# Patient Record
Sex: MALE | Race: WHITE | URBAN - METROPOLITAN AREA
[De-identification: names, ages, dates, MRNs, and addresses within clinical notes are randomized per-mention and may not be internally consistent; named-entity substitution may affect disease eponyms.]

---

## 2018-03-22 ENCOUNTER — HOSPITAL ENCOUNTER (OUTPATIENT)
Facility: CLINIC | Age: 38
Setting detail: OBSERVATION
Discharge: HOME | End: 2018-03-22
Attending: EMERGENCY MEDICINE | Admitting: HOSPITALIST
Payer: COMMERCIAL

## 2018-03-22 ENCOUNTER — APPOINTMENT (OUTPATIENT)
Dept: GENERAL RADIOLOGY | Facility: CLINIC | Age: 38
End: 2018-03-22
Attending: EMERGENCY MEDICINE
Payer: COMMERCIAL

## 2018-03-22 ENCOUNTER — APPOINTMENT (OUTPATIENT)
Dept: CT IMAGING | Facility: CLINIC | Age: 38
End: 2018-03-22
Attending: EMERGENCY MEDICINE
Payer: COMMERCIAL

## 2018-03-22 VITALS
RESPIRATION RATE: 12 BRPM | HEIGHT: 71 IN | WEIGHT: 177.91 LBS | TEMPERATURE: 96.9 F | BODY MASS INDEX: 24.91 KG/M2 | OXYGEN SATURATION: 96 % | SYSTOLIC BLOOD PRESSURE: 109 MMHG | DIASTOLIC BLOOD PRESSURE: 61 MMHG | HEART RATE: 90 BPM

## 2018-03-22 DIAGNOSIS — R09.02 HYPOXIA: ICD-10-CM

## 2018-03-22 DIAGNOSIS — T50.905A MEDICATION REACTION, INITIAL ENCOUNTER: ICD-10-CM

## 2018-03-22 PROBLEM — R42 DIZZINESS: Status: ACTIVE | Noted: 2018-03-22

## 2018-03-22 LAB
AMPHETAMINES UR QL SCN: NEGATIVE
ANION GAP SERPL CALCULATED.3IONS-SCNC: 12 MMOL/L (ref 3–14)
ANION GAP SERPL CALCULATED.3IONS-SCNC: 9 MMOL/L (ref 3–14)
BARBITURATES UR QL: NEGATIVE
BASOPHILS # BLD AUTO: 0 10E9/L (ref 0–0.2)
BASOPHILS NFR BLD AUTO: 0.2 %
BENZODIAZ UR QL: NEGATIVE
BUN SERPL-MCNC: 20 MG/DL (ref 7–30)
BUN SERPL-MCNC: 24 MG/DL (ref 7–30)
CALCIUM SERPL-MCNC: 7.8 MG/DL (ref 8.5–10.1)
CALCIUM SERPL-MCNC: 8.5 MG/DL (ref 8.5–10.1)
CANNABINOIDS UR QL SCN: NEGATIVE
CHLORIDE SERPL-SCNC: 101 MMOL/L (ref 94–109)
CHLORIDE SERPL-SCNC: 104 MMOL/L (ref 94–109)
CO2 SERPL-SCNC: 24 MMOL/L (ref 20–32)
CO2 SERPL-SCNC: 25 MMOL/L (ref 20–32)
COCAINE UR QL: NEGATIVE
CREAT SERPL-MCNC: 1.18 MG/DL (ref 0.66–1.25)
CREAT SERPL-MCNC: 1.5 MG/DL (ref 0.66–1.25)
D DIMER PPP FEU-MCNC: 0.4 UG/ML FEU (ref 0–0.5)
DIFFERENTIAL METHOD BLD: ABNORMAL
EOSINOPHIL # BLD AUTO: 0 10E9/L (ref 0–0.7)
EOSINOPHIL NFR BLD AUTO: 0.1 %
ERYTHROCYTE [DISTWIDTH] IN BLOOD BY AUTOMATED COUNT: 13.1 % (ref 10–15)
ETHANOL SERPL-MCNC: <0.01 G/DL
GFR SERPL CREATININE-BSD FRML MDRD: 53 ML/MIN/1.7M2
GFR SERPL CREATININE-BSD FRML MDRD: 70 ML/MIN/1.7M2
GLUCOSE SERPL-MCNC: 81 MG/DL (ref 70–99)
GLUCOSE SERPL-MCNC: 95 MG/DL (ref 70–99)
HCT VFR BLD AUTO: 45.7 % (ref 40–53)
HGB BLD-MCNC: 15.5 G/DL (ref 13.3–17.7)
IMM GRANULOCYTES # BLD: 0.2 10E9/L (ref 0–0.4)
IMM GRANULOCYTES NFR BLD: 1.1 %
INTERPRETATION ECG - MUSE: NORMAL
LACTATE BLD-SCNC: 1.9 MMOL/L (ref 0.7–2)
LYMPHOCYTES # BLD AUTO: 0.9 10E9/L (ref 0.8–5.3)
LYMPHOCYTES NFR BLD AUTO: 5.5 %
MCH RBC QN AUTO: 32 PG (ref 26.5–33)
MCHC RBC AUTO-ENTMCNC: 33.9 G/DL (ref 31.5–36.5)
MCV RBC AUTO: 94 FL (ref 78–100)
MONOCYTES # BLD AUTO: 1.8 10E9/L (ref 0–1.3)
MONOCYTES NFR BLD AUTO: 11.2 %
NEUTROPHILS # BLD AUTO: 13.5 10E9/L (ref 1.6–8.3)
NEUTROPHILS NFR BLD AUTO: 81.9 %
OPIATES UR QL SCN: NEGATIVE
PCP UR QL SCN: NEGATIVE
PLATELET # BLD AUTO: 208 10E9/L (ref 150–450)
POTASSIUM SERPL-SCNC: 4 MMOL/L (ref 3.4–5.3)
POTASSIUM SERPL-SCNC: 5.3 MMOL/L (ref 3.4–5.3)
RBC # BLD AUTO: 4.85 10E12/L (ref 4.4–5.9)
SODIUM SERPL-SCNC: 134 MMOL/L (ref 133–144)
SODIUM SERPL-SCNC: 141 MMOL/L (ref 133–144)
WBC # BLD AUTO: 14.2 10E9/L (ref 4–11)
WBC # BLD AUTO: 16.5 10E9/L (ref 4–11)

## 2018-03-22 PROCEDURE — 25000132 ZZH RX MED GY IP 250 OP 250 PS 637: Performed by: HOSPITALIST

## 2018-03-22 PROCEDURE — 85025 COMPLETE CBC W/AUTO DIFF WBC: CPT | Performed by: EMERGENCY MEDICINE

## 2018-03-22 PROCEDURE — 80320 DRUG SCREEN QUANTALCOHOLS: CPT | Performed by: EMERGENCY MEDICINE

## 2018-03-22 PROCEDURE — 85379 FIBRIN DEGRADATION QUANT: CPT | Performed by: EMERGENCY MEDICINE

## 2018-03-22 PROCEDURE — G0378 HOSPITAL OBSERVATION PER HR: HCPCS

## 2018-03-22 PROCEDURE — 96374 THER/PROPH/DIAG INJ IV PUSH: CPT

## 2018-03-22 PROCEDURE — 80307 DRUG TEST PRSMV CHEM ANLYZR: CPT | Performed by: HOSPITALIST

## 2018-03-22 PROCEDURE — 99285 EMERGENCY DEPT VISIT HI MDM: CPT | Mod: 25

## 2018-03-22 PROCEDURE — 80048 BASIC METABOLIC PNL TOTAL CA: CPT | Performed by: EMERGENCY MEDICINE

## 2018-03-22 PROCEDURE — 96361 HYDRATE IV INFUSION ADD-ON: CPT

## 2018-03-22 PROCEDURE — 93005 ELECTROCARDIOGRAM TRACING: CPT

## 2018-03-22 PROCEDURE — 25000131 ZZH RX MED GY IP 250 OP 636 PS 637: Performed by: EMERGENCY MEDICINE

## 2018-03-22 PROCEDURE — 71046 X-RAY EXAM CHEST 2 VIEWS: CPT

## 2018-03-22 PROCEDURE — 80048 BASIC METABOLIC PNL TOTAL CA: CPT | Performed by: PHYSICIAN ASSISTANT

## 2018-03-22 PROCEDURE — 85048 AUTOMATED LEUKOCYTE COUNT: CPT | Mod: 91 | Performed by: PHYSICIAN ASSISTANT

## 2018-03-22 PROCEDURE — 25000128 H RX IP 250 OP 636: Performed by: EMERGENCY MEDICINE

## 2018-03-22 PROCEDURE — 70450 CT HEAD/BRAIN W/O DYE: CPT

## 2018-03-22 PROCEDURE — 36415 COLL VENOUS BLD VENIPUNCTURE: CPT | Performed by: PHYSICIAN ASSISTANT

## 2018-03-22 PROCEDURE — 25000128 H RX IP 250 OP 636: Performed by: HOSPITALIST

## 2018-03-22 PROCEDURE — 83605 ASSAY OF LACTIC ACID: CPT | Performed by: EMERGENCY MEDICINE

## 2018-03-22 PROCEDURE — 99220 ZZC INITIAL OBSERVATION CARE,LEVL III: CPT | Performed by: HOSPITALIST

## 2018-03-22 RX ORDER — ONDANSETRON 2 MG/ML
4 INJECTION INTRAMUSCULAR; INTRAVENOUS EVERY 6 HOURS PRN
Status: DISCONTINUED | OUTPATIENT
Start: 2018-03-22 | End: 2018-03-22 | Stop reason: HOSPADM

## 2018-03-22 RX ORDER — GABAPENTIN 100 MG/1
300 CAPSULE ORAL 3 TIMES DAILY
COMMUNITY

## 2018-03-22 RX ORDER — NALOXONE HYDROCHLORIDE 0.4 MG/ML
.1-.4 INJECTION, SOLUTION INTRAMUSCULAR; INTRAVENOUS; SUBCUTANEOUS
Status: DISCONTINUED | OUTPATIENT
Start: 2018-03-22 | End: 2018-03-22 | Stop reason: HOSPADM

## 2018-03-22 RX ORDER — ACETAMINOPHEN 325 MG/1
650 TABLET ORAL EVERY 4 HOURS PRN
Status: DISCONTINUED | OUTPATIENT
Start: 2018-03-22 | End: 2018-03-22 | Stop reason: HOSPADM

## 2018-03-22 RX ORDER — ACETAMINOPHEN 650 MG/1
650 SUPPOSITORY RECTAL EVERY 4 HOURS PRN
Status: DISCONTINUED | OUTPATIENT
Start: 2018-03-22 | End: 2018-03-22 | Stop reason: HOSPADM

## 2018-03-22 RX ORDER — SODIUM CHLORIDE 9 MG/ML
INJECTION, SOLUTION INTRAVENOUS CONTINUOUS
Status: DISCONTINUED | OUTPATIENT
Start: 2018-03-22 | End: 2018-03-22 | Stop reason: HOSPADM

## 2018-03-22 RX ORDER — MECLIZINE HYDROCHLORIDE 25 MG/1
25 TABLET ORAL ONCE
Status: COMPLETED | OUTPATIENT
Start: 2018-03-22 | End: 2018-03-22

## 2018-03-22 RX ORDER — ONDANSETRON 4 MG/1
4 TABLET, ORALLY DISINTEGRATING ORAL EVERY 6 HOURS PRN
Status: DISCONTINUED | OUTPATIENT
Start: 2018-03-22 | End: 2018-03-22 | Stop reason: HOSPADM

## 2018-03-22 RX ORDER — ONDANSETRON 2 MG/ML
4 INJECTION INTRAMUSCULAR; INTRAVENOUS ONCE
Status: COMPLETED | OUTPATIENT
Start: 2018-03-22 | End: 2018-03-22

## 2018-03-22 RX ORDER — MECLIZINE HCL 12.5 MG 12.5 MG/1
12.5 TABLET ORAL 3 TIMES DAILY PRN
Status: DISCONTINUED | OUTPATIENT
Start: 2018-03-22 | End: 2018-03-22 | Stop reason: HOSPADM

## 2018-03-22 RX ORDER — POLYETHYLENE GLYCOL 3350 17 G/17G
17 POWDER, FOR SOLUTION ORAL DAILY PRN
Status: DISCONTINUED | OUTPATIENT
Start: 2018-03-22 | End: 2018-03-22 | Stop reason: HOSPADM

## 2018-03-22 RX ORDER — DOCUSATE SODIUM 100 MG/1
100 CAPSULE, LIQUID FILLED ORAL 2 TIMES DAILY
Status: DISCONTINUED | OUTPATIENT
Start: 2018-03-22 | End: 2018-03-22 | Stop reason: HOSPADM

## 2018-03-22 RX ADMIN — MECLIZINE HYDROCHLORIDE 25 MG: 25 TABLET ORAL at 02:39

## 2018-03-22 RX ADMIN — ACETAMINOPHEN 650 MG: 325 TABLET, FILM COATED ORAL at 06:04

## 2018-03-22 RX ADMIN — ONDANSETRON 4 MG: 2 INJECTION INTRAMUSCULAR; INTRAVENOUS at 02:39

## 2018-03-22 RX ADMIN — SODIUM CHLORIDE 1000 ML: 9 INJECTION, SOLUTION INTRAVENOUS at 03:11

## 2018-03-22 RX ADMIN — SODIUM CHLORIDE, PRESERVATIVE FREE: 5 INJECTION INTRAVENOUS at 05:54

## 2018-03-22 ASSESSMENT — ENCOUNTER SYMPTOMS
NECK PAIN: 0
VOMITING: 1
DIZZINESS: 1
TREMORS: 1
HEADACHES: 0

## 2018-03-22 NOTE — DISCHARGE SUMMARY
Patient discharged to home on 3/22/2018 at 11:08 AM ad lorri. All belongings with patient. Patient education given and all questions answered. Medication regimen discussed with patient. IV removed. Upcoming appointments also discussed. Patient verbalized understanding.

## 2018-03-22 NOTE — ED PROVIDER NOTES
"  History     Chief Complaint:  Dizziness    The history is provided by the patient and the EMS personnel.      Mateo Spencer is a 38 year old male who presents via EMS with dizziness. The patient woke up about 45 minutes ago with ringing in his ears bilaterally. When he got up to walk around he immediately felt dizziness and \"his gait was off.\" He called EMS after he was unable to walk upright and was crawling on the floor on all fours to steady himself. When EMS arrived the patient had 1 episode of emesis. Here in the ED the patient states that the ringing in his ears has resolved but he still feels slightly dizzy and shaky. Of note, the patient started Gabapentin about 5 weeks ago and his dosing has not changed. He also took a leftover pain medication for his chronic back pain earlier this evening. He denies any illicit drug use, headache, neck pain, recent head injury, or other medical concerns.     Allergies:  No known drug allergies      Medications:    Gabapentin    Past Medical History:    The patient does not have any past pertinent medical history.     Past Surgical History:    History reviewed. No pertinent surgical history.     Family History:    History reviewed. No pertinent family history.      Social History:  Presents via EMS   Tobacco use: Current Every Day Smoker  Alcohol use: Yes  PCP: Physician No Ref-Primary    Marital Status:  Single    Review of Systems   Gastrointestinal: Positive for vomiting.   Musculoskeletal: Positive for gait problem. Negative for neck pain.   Neurological: Positive for dizziness and tremors. Negative for headaches.   All other systems reviewed and are negative.    Physical Exam     Patient Vitals for the past 24 hrs:   BP Temp Temp src Pulse Resp SpO2 Height Weight   03/22/18 0400 97/66 - - - - 98 % - -   03/22/18 0330 99/64 - - - - 98 % - -   03/22/18 0317 99/68 - - - - 98 % - -   03/22/18 0300 - - - - - (!) 89 % - -   03/22/18 0237 - - - - - 99 % - - " "  03/22/18 0236 - - - - - (!) 81 % - -   03/22/18 0227 - 97.1  F (36.2  C) Temporal - - - - -   03/22/18 0223 122/72 - - 96 20 100 % 1.803 m (5' 11\") 78 kg (172 lb)      Physical Exam  Constitutional: The patient is oriented to person, place, and time.   HENT:   Head: Atraumatic  Right Ear: Normal  Left Ear: Normal  Nose: Nose normal.   Mouth/Throat: Oropharynx is clear and moist. No erythema or exudate.   Eyes: Conjunctivae and EOM are normal. Pupils are equal, round, and reactive to light. No discharge. Pinpoint pupils, no nystagmus.  Neck: Normal range of motion. Neck supple.   Cardiovascular: Tachycardic rate, regular rhythm, no murmur gallops or rubs. Intact distal pulses.    Pulmonary/Chest: CTA bilaterally. No wheezes rale or rhonchi.  Abdominal: Soft. Non tender.  No masses   Musculoskeletal: No edema. No bony deformity. Normal range of motion  Lymphadenopathy:     The patient has no cervical adenopathy.   Neurological: The patient is alert and oriented to person, place, and time. The patient has normal strength and normal reflexes. No cranial nerve deficit. Coordination normal. Able to perform finger-nose and heel-shin without difficulty.  Skin: Skin is warm and dry. No rash noted. The patient is not diaphoretic.   Psychiatric: The patient has a normal mood and affect.     Emergency Department Course   ECG (2:34:21):  Rate 102 bpm. FL interval 154. QRS duration 100. QT/QTc 392/510. P-R-T axes 67 72 41. Sinus tachycardia. Otherwise normal ECG. Agree with computer interpretation. Interpreted at 0236 by Edwin Gonzalez MD.     Imaging:  Radiographic findings were communicated with the patient who voiced understanding of the findings.  Head CT w/o Contrast  IMPRESSION: No acute abnormality.      LATRELL RUIZ MD    Chest XR, PA & LAT  IMPRESSION: No acute abnormality.  Preliminary reading per radiology.     Imaging independently reviewed and agree with radiologist interpretation.     Laboratory:  CBC: WBC " 16.5 (H), o/w WNL (HGB 15.5, )    BMP: Creatinine 1.50 (H), GFR Estimate 53 (L), o/w WNL   D-dimer: 0.4  Lactic Acid: 1.9      Alcohol ethyl: <0.01    Interventions:  0239: Zofran 4mg IV injection    0239: Antivert 25 mg PO  0311: NS 1L IV Bolus     Emergency Department Course:  Past medical records, nursing notes, and vitals reviewed.  0223: I performed an exam of the patient and obtained history, as documented above.    0340: I rechecked the patient. Explained findings to patient.     Findings and plan explained to the Patient who consents to admission.     0440: Discussed the patient with Dr. Leung, who will admit the patient to an obs bed for further monitoring, evaluation, and treatment.      I personally reviewed the laboratory results with the Patient and answered all related questions prior to admit.     Impression & Plan    Medical Decision Making:  Mateo Spencer is a 38 year old male who presents with a chief complaint of dizziness which came on suddenly at around 0400 this morning. His physical exam is unremarkable other than a mild tachycardia with a rate of around 105. I did note that he had some pinpoint pupils as well. He did admit to taking a single dose of oral pain medication earlier in the evening. Patient negative head CT and normal neurologic exam with able to perform normal finger-nose and heel-shin without difficulty, making intercranial process unlikely. Patient was somewhat sleepy and noted to become somewhat hypoxic when falling asleep which I suspect is related to narcotic pain medication. Did consider possibility of infection as he does note having a cough recently and elevated WBC; however, his chest XR is negative and he is afebrile with lactate within the normal range at 1.9. Patient continued to have persistent hypoxia when off oxygen, especially when asleep and thus I felt he was not safe for discharge home. Upon further discussion he actually took a friend's  methadone. He is unsure of the dose.  This  may be causing all of his symptoms at this time. In light of this I do feel admission and observation is warranted until this clears.   as the half life is quite long I do not feel  he can be simply observed here in the ED. I spoke with Dr. Leung of hospitalist service and he accepts the patient for further observation.  Diagnosis:    ICD-10-CM    1. Medication reaction, initial encounter T88.7XXA    2. Hypoxia R09.02        Disposition:  Admitted to Dr. Leung for further evaluation and treatment.      Palomo Esposito  3/22/2018    EMERGENCY DEPARTMENT  I, Palomo Esposito, am serving as a scribe at 2:23 AM on 3/22/2018 to document services personally performed by Edwin Gonzalez MD based on my observations and the provider's statements to me.       Edwin Gonzalez MD  03/22/18 0531

## 2018-03-22 NOTE — DISCHARGE SUMMARY
Admit Date:     03/22/2018   Discharge Date:     03/22/2018      PRIMARY CARE PROVIDER:  None.        DATE OF ADMISSION:  03/22/2018.      DATE OF DISCHARGE:  03/22/2018.      DISCHARGE DIAGNOSES:   1.  Dizziness.   2.  Hypoxia.   3.  Elevated creatinine, thought to be secondary to dehydration.   4.  Tobacco abuse.      DISCHARGE MEDICATIONS:      Review of your medicines      CONTINUE these medicines which have NOT CHANGED       Dose / Directions    gabapentin 100 MG capsule   Commonly known as:  NEURONTIN        Dose:  300 mg   Take 300 mg by mouth 3 times daily   Refills:  0       IBUPROFEN PO        Dose:  400 mg   Take 400 mg by mouth daily as needed for moderate pain   Refills:  0         STOP taking          METHADONE HCL PO                  ALLERGIES:  No known drug allergies.      DISPOSITION:  Home.      FOLLOWUP WITH RECOMMENDATIONS:  The patient should follow up with primary care provider within 1 week for hospital followup.  Recommend checking a basic metabolic panel.      ACTIVITY:  As tolerated.      DIET:  Regular diet.      CONSULTS:  None.      LABORATORY, IMAGING AND PROCEDURES:   1.  Routine laboratory studies that included basic metabolic panel x 2.  CBC with platelets, differential, D-dimer, ethyl alcohol level, lactic acid level, urine drug screen and white blood cell count.   2.  Head CT without contrast.   3.  Two-view chest x-ray.   4.  EKG.      PENDING RESULTS:  None.      PHYSICAL EXAMINATION ON DAY OF DISCHARGE:   VITAL SIGNS:  Temperature is 96.9 degrees Fahrenheit with a blood pressure 109/61, heart rate of 90 beats per minute, respiratory rate of 12, O2 saturation 96% on room air.  The patient is denying any pain.   GENERAL:  The patient is awake, alert and cooperative, in no apparent distress, alert and oriented x 3.   HEENT:  Head is normocephalic, atraumatic.  Moist mucous membranes present.  No exudates noted in the posterior pharynx.  Uvula is midline.   NECK:  Supple, normal  range of motion, no tracheal deviation, no cervical lymphadenopathy present.   CARDIOVASCULAR:  Regular rate and rhythm, no rubs, murmurs or gallops appreciated.   PULMONARY:  Lungs are clear to auscultation bilaterally, no wheezes, rhonchi or rales appreciated.   GASTROINTESTINAL:  Bowel sounds are present in all 4 quadrants, soft, nontender, nondistended.   NEUROLOGIC:  Cranial nerves II-XII are grossly intact.  The patient is seen moving all 4 extremities without any difficulty.   EXTREMITIES:  No lower extremity edema noted bilaterally.  Calves are nontender to palpation.      BRIEF HISTORY OF PRESENT ILLNESS:  Mateo Spencer is a 38-year-old male with a past medical history significant for low back pain with noted bulging disk and radiculopathy who was registered to observation due to dizziness, hypoxia, and suspected acute kidney injury.      HOSPITAL COURSE:   1.  Dizziness:  Initially unclear etiology, though I believe that this is likely secondary to dehydration.  The patient also taken at friend's 10 mg dose of methadone, which could have also contributed to dizziness once this began wearing off.  U-Tox came back negative.  The patient was started on IV fluids and received 1 liter bolus in the Emergency Department and continued IV fluid resuscitation at 150 mL an hour with normal saline.  At time of discharge, dizziness has resolved.   2.  Hypoxia:  This was thought to be due to lack of deep breaths.  The patient had noted O2 saturations dropping into the mid-80s but with appropriate response when advised to take deep breaths.  He denied dyspnea.  Chest x-ray was negative for any acute findings.  This has since resolved and patient is currently on room air without any complaints of shortness of breath.   3.  Acute kidney injury:  The patient had noted a creatinine level of 1.5 with a GFR of 53 without known baseline.  The patient has received IV fluid resuscitation and upon repeat basic metabolic  panel, creatinine has normalized at 1.18 with a GFR of 70.  Upon discussion with the patient, it appears he has had decreased p.o. intake, especially with water and has been advised to make a better effort with oral intake.   4.  Tobacco abuse:  Nicotine gum was ordered during this stay but I do not believe it was utilized.  The patient will follow up with primary care provider regarding cessation counseling.   5.  Discharge Pain Plan: Patient has ongoing back pain due to bulging disc being worked up as an outpatient.  No changes were made to ongoing regimen including gabapentin 300 mg TID and PRN IBU.  Patient advised not to take friend's methadone.       CODE STATUS:  Full code.      The patient was discussed with Dr. Jimmy Maher who agrees with discharge at this time.  Dr. Maher will evaluate the patient independently.      Total discharge time less than 30 minutes.         JIMMY MAHER MD       As dictated by REMINGTON GODOY PA-C            D: 2018   T: 2018   MT: SHERRY      Name:     LIZET SOLITARIO   MRN:      [id number]        Account:        [id number]   :      1980           Admit Date:     2018                                  Discharge Date: 2018      Document: [id number]

## 2018-03-22 NOTE — PLAN OF CARE
Problem: Patient Care Overview  Goal: Plan of Care/Patient Progress Review  Outcome: Improving  PRIOR TO DISCHARGE     Comments: -diagnostic tests and consults completed and resulted: met  -vital signs normal or at patient baseline: met on RA   -dizziness improves, able to get up and ambulate safely: met, able to ambulate without dizziness     A&Ox4, VSS on RA. C/o sweats, no further c/o dizziness. Tele NSR. Denies pain. Up ad lorri, has slight limp due to sciatica. Reg diet, voiding adequately. Urine sample sent - negative. Blood drawn for kidney function.  IV NS at 150/hour for shift.

## 2018-03-22 NOTE — H&P
"Admitted:     03/22/2018      PRIMARY CARE PHYSICIAN:  None.      CHIEF COMPLAINT:  Dizziness.      HISTORY OF PRESENT ILLNESS:  Mateo Spencer is a 38-year-old male with a medical history significant for lumber disk herniation and sciatica, was brought to the ER by EMS for evaluation of dizziness.  I discussed with the patient and Dr. Gonzalez who evaluated the patient in the ER for further information.  I reviewed record in Care Everywhere as well.      According to the patient, he was not able to sleep for almost 2 months because of his back pain and so talked to his friend who gave him a \"pain pill\", which he said was methadone.  He took it at around 10 in the morning yesterday and wanted to take a nap so went to bed around 5:00 p.m.  He says he took a long nap.  He says something woke him up around 4:00 in the morning but on getting up patient felt extremely dizzy and had to crawl on the floor.  He felt a ringing sensation in the ear and also could not hear.  He was very shaky and did not have any strength and he kept falling to the floor.      Patient denies any chest pain, palpitations, blurry vision or diplopia.  He felt feverish and hot and chills, had nausea and vomited about 3-4 times.  Denies abdominal pain or diarrhea.  He then called 911 for help.  In ER, the patient was evaluated by Dr. Gonzalez.  His vitals other than pulse ox were stable.  The patient frequently was dozing off and his oxygen SAT was dropping to 80s.  Lab data showed a creatinine of 1.50 with no known baseline.  Lactic acid was negative.  WBC was 16.5, D-dimer 0.4.  Alcohol less than 0.01.  Blood sugar was 81.  A 12-lead EKG showed sinus tachycardia.  Chest x-ray was done which did not show any abnormality.  CT head without contrast was again negative for any acute process.  U-tox was requested and is pending.  The patient will be placed in observation for close monitoring.      The patient denies any recent ear infection, cough " and cold, sore throat.  He denies dizziness with any specific position.  Denies headache, earache.      REVIEW OF SYSTEMS:  All 10-point systems were reviewed and is negative other than mentioned in the HPI.      PAST MEDICAL HISTORY:  L5-S4 disk herniation and sciatica on right, patient is on gabapentin 300 t.i.d., no recent change in medication dose.      PAST SURGICAL HISTORY:  Epidural injections about 2 weeks ago.      ALLERGIES:  NO KNOWN DRUG ALLERGIES.      PTA MEDICATIONS:  Gabapentin 300 mg p.o. t.i.d.      SOCIAL HISTORY:  Smokes 1 pack per day.  Drinks socially.  Denies any recreational drug use.      FAMILY HISTORY:  Father with colon cancer.      PHYSICAL EXAMINATION:   GENERAL:  The patient is alert, awake, oriented, appears comfortable.   VITAL SIGNS:  Blood pressure 110/81, heart rate 96, temperature 97.1, respirations 20.  Pulse ox 95, which is dropping to mid 80s frequently but promptly comes up to 90s.   HEENT:  Pupils are bilaterally very constricted to about 1.5-2 mm in size.  Oral mucosa moist, mild pharyngeal congestion.   NECK:  Supple.   LUNGS:  Bilateral equal air entry, clear to auscultation.  Normal work of breathing, placed on 2 liters nasal cannula oxygen.   CARDIOVASCULAR:  S1, S2, regular, no murmur, rub, gallop.   ABDOMEN:  Soft, nontender, bowel sounds active.   MUSCULOSKELETAL:  No edema.   NEUROLOGIC:  Alert and oriented.  Cranial nerves II-XII intact.   SKIN:  Has minor abrasion over the right lip.      LABORATORY DATA:  White cell count 16.5, hemoglobin 15.5, hematocrit 45.7, platelet 208,000.  Sodium 141, potassium 4, chloride 104, bicarbonate 25, BUN 20, creatinine 1.5, calcium 8.5, anion gap 12.  Lactic acid 1.9.  Blood sugar 81.  D-dimer 0.4.  Alcohol level less than 0.01      Chest x-ray was reviewed by me, which does show somewhat globular heart, but is an A/P view, no pneumothorax, effusion or infiltrate noted.  No mediastinal widening noted.      IMAGING:  CT head  without contrast report was reviewed by me.  It is negative for acute abnormality.  A 12-lead EKG shows sinus tachycardia.      ASSESSMENT AND PLAN:  Mateo Spencer is a 38-year-old man with back pain/sciatica who was brought to the ER for evaluation of dizziness.      1.  Dizziness.  Unclear etiology, but I do suspect this could be related to medication or unknown substance.  The patient denies any drug abuse.  He stated he took a pill of methadone around 10 in the morning yesterday which was given by his friend.  His pupils are pinpoint.  He has some conjunctival congestion.  There is no chest pain, tachycardia, neuro deficits on exam.  The D-dimer was negative.  CT head also negative for acute process.     -The patient will be placed in observation.   -Monitor with telemetry and continuous pulse ox.     -I have requested U-tox, will follow.     -I will hydrate him with normal saline at 150 mL an hour.   -Check orthostatic blood pressure.     -Further workup based on urine toxicity.      2.  Hypoxia, likely due to lack of deep breaths.  His oxygen saturations are dropping to mid 80s but comes up to normal appropriately.  No dyspnea.  Chest x-ray normal.  He had a few episodes of vomiting but no sign of aspiration.  He has mild leukocytosis but is afebrile otherwise.  Continue to monitor.      4.  Elevated creatinine, unknown baseline/other prior creatinine.  Hydrate with IV fluid and repeat.      5.  Tobacco abuse.  Nicotine gum is ordered.      6.  Deep venous thrombosis prophylaxis.  Anticipate short stay and encourage ambulation.      CODE STATUS:  Full code by default.      DISPOSITION:  Observation orders entered, patient has had prior falls with concussions, if persists despite IV hydration and if U-tox is negative, will consider echo and MRI brain for further evaluation.  Above plan was discussed with the patient.  Anticipate about 24 hours of hospital stay.         LENARD MARTINEZ MD              D: 2018   T: 2018   MT: DIANN      Name:     LIZET SOLITARIO   MRN:      [id number]        Account:      [id number]   :      1980        Admitted:     2018                   Document: [id number]

## 2018-03-22 NOTE — IP AVS SNAPSHOT
[facility]    [address] [place] [id number]    Phone:  [phone]                                       After Visit Summary   3/22/2018    Mateo Spencer    MRN: [id number]           After Visit Summary Signature Page     I have received my discharge instructions, and my questions have been answered. I have discussed any challenges I see with this plan with the nurse or doctor.    ..........................................................................................................................................  Patient/Patient Representative Signature      ..........................................................................................................................................  Patient Representative Print Name and Relationship to Patient    ..................................................               ................................................  Date                                            Time    ..........................................................................................................................................  Reviewed by Signature/Title    ...................................................              ..............................................  Date                                                            Time

## 2018-03-22 NOTE — IP AVS SNAPSHOT
MRN:[id number]                      After Visit Summary   3/22/2018    Mateo Spencer    MRN: [id number]           Thank you!     Thank you for choosing [facility] [facility] for your care. Our goal is always to provide you with excellent care. Hearing back from our patients is one way we can continue to improve our services. Please take a few minutes to complete the written survey that you may receive in the mail after you visit with us. Thank you!        Patient Information     Date Of Birth          1980        About your hospital stay     You were admitted on:  March 22, 2018 [facility]    You were discharged on:  March 22, 2018        Reason for your hospital stay       You were registered to observation due to dizziness, hypoxia (low oxygen sat) and elevated creatinine (stressed kidney).  This all resolved.                  Who to Call     For medical emergencies, please call 911.  For non-urgent questions about your medical care, please call your primary care provider or clinic, None          Attending Provider     Provider Specialty    Edwin Gonzalez MD Emergency Medicine    [facility]       Primary Care Provider Fax #    Physician No Ref-Primary [phone]      After Care Instructions     Activity       Your activity upon discharge: activity as tolerated            Diet       Follow this diet upon discharge: Orders Placed This Encounter      Regular Diet Adult                  Follow-up Appointments     Follow-up and recommended labs and tests        Follow up with primary care provider, Physician No Ref-Primary, within 7 days for hospital follow- up.  The following labs/tests are recommended: BMP.                  Pending Results     No orders found from 3/20/2018 to 3/23/2018.            Statement of Approval     Ordered          03/22/18 1043  I have reviewed and agree with all the recommendations and  "orders detailed in this document.  EFFECTIVE NOW     Approved and electronically signed by:  Vinnie Levin PA-C             Admission Information     Date & Time Provider Department Dept. Phone    3/22/2018 Manuel Leung MD [facility] [phone]      Your Vitals Were     Blood Pressure Pulse Temperature Respirations Height Weight    109/61 (BP Location: Right arm) 90 96.9  F (36.1  C) (Oral) 12 1.803 m (5' 10.98\") 80.7 kg (177 lb 14.6 oz)    Pulse Oximetry BMI (Body Mass Index)                96% 24.82 kg/m2          MyChart Information     [employer] lets you send messages to your doctor, view your test results, renew your prescriptions, schedule appointments and more. To sign up, go to [email or web address] . Click on \"Log in\" on the left side of the screen, which will take you to the Welcome page. Then click on \"Sign up Now\" on the right side of the page.     You will be asked to enter the access code listed below, as well as some personal information. Please follow the directions to create your username and password.     Your access code is: [email or web address]  Expires: 2018 10:44 AM     Your access code will  in 90 days. If you need help or a new code, please call your [place] clinic or [phone].        Care EveryWhere ID     This is your Care EveryWhere ID. This could be used by other organizations to access your [place] medical records  [id number]        Equal Access to Services     [facility] HospitalCHANTE : Hadii bonnie calloway Sodelia, waaxda luqadaha, qaybta kaalmachaparro miles, mark phelps . So [facility] [phone].    ATENCIÓN: Si habla español, tiene a wallace disposición servicios gratuitos de asistencia lingüística. Llame al [phone].    We comply with applicable federal civil rights laws and [place] laws. We do not discriminate on the basis of race, color, national origin, age, disability, sex, sexual orientation, or gender identity.       "         Review of your medicines      CONTINUE these medicines which have NOT CHANGED        Dose / Directions    gabapentin 100 MG capsule   Commonly known as:  NEURONTIN        Dose:  300 mg   Take 300 mg by mouth 3 times daily   Refills:  0       IBUPROFEN PO        Dose:  400 mg   Take 400 mg by mouth daily as needed for moderate pain   Refills:  0         STOP taking     METHADONE HCL PO                    Protect others around you: Learn how to safely use, store and throw away your medicines at www.disposemymeds.org.             Medication List: This is a list of all your medications and when to take them. Check marks below indicate your daily home schedule. Keep this list as a reference.      Medications           Morning Afternoon Evening Bedtime As Needed    gabapentin 100 MG capsule   Commonly known as:  NEURONTIN   Take 300 mg by mouth 3 times daily                                      IBUPROFEN PO   Take 400 mg by mouth daily as needed for moderate pain

## 2018-03-22 NOTE — ED NOTES
Bed: ED26  Expected date:   Expected time:   Means of arrival:   Comments:   36m Dizziness. ETA [id number]

## 2018-03-22 NOTE — PROGRESS NOTES
SW   I GENNY consult for discharge planning acknowledged. Pt is independent at home and has no social service needs at this time.  P GENNY signing off.. SW will visit if need arises.  DEON Matthew  [facility]  Phone: [phone]

## 2018-03-22 NOTE — PROGRESS NOTES
RECEIVING UNIT ED HANDOFF REVIEW    ED Nurse Handoff Report was reviewed by: Loretta Cruz on March 22, 2018 at 5:32 AM

## 2018-03-22 NOTE — PHARMACY-ADMISSION MEDICATION HISTORY
"Admission medication history interview status for the 3/22/2018  admission is complete. See EPIC admission navigator for prior to admission medications     Medication history source reliability:Good    Actions taken by pharmacist (provider contacted, etc):None     Additional medication history information not noted on PTA med list : Patient stated that he could not sleep for the last couple of months. His friend visited him on 3/21 in the morning and gave him Methadone solution to help with his pain and sleep. He does not know the dose. He stated that he only took it once on 3/21/18 @10:00 in the morning    Medication reconciliation/reorder completed by provider prior to medication history? Yes    Time spent in this activity: 15 mins    Prior to Admission medications    Medication Sig Last Dose Taking? Auth Provider   gabapentin (NEURONTIN) 100 MG capsule Take 300 mg by mouth 3 times daily 3/21/2018 at afternoon Yes Unknown, Entered By History   METHADONE HCL PO Take by mouth once \"oral solution\" unknown dose 3/21/2018 at 10:00 Yes Unknown, Entered By History   IBUPROFEN PO Take 400 mg by mouth daily as needed for moderate pain 3/20/2018 at prn Yes Unknown, Entered By History       "

## 2018-03-22 NOTE — PLAN OF CARE
Problem: Patient Care Overview  Goal: Plan of Care/Patient Progress Review  Outcome: No Change  PRIOR TO DISCHARGE     Comments: -diagnostic tests and consults completed and resulted: Goal not met  -vital signs normal or at patient baseline: goal not met: 1L O2  -dizziness improves, able to get up and ambulate safely: Goal not met     VSS c/o numbness in fingertips. Need a UA. IVF's infusing. Tylenol given for bilat shoulder pain and low back pain. Nicorette gum available.

## 2018-03-22 NOTE — ED NOTES
"[facility]  ED Nurse Handoff Report    ED Chief complaint: Dizziness (bed at 4:30 and woke up about 45 mins ago very confused, dizzy, gate off)      ED Diagnosis:   Final diagnoses:   Medication reaction, initial encounter   Hypoxia       Code Status: Full Code    Allergies: No Known Allergies    Activity level - Baseline/Home:  Independent    Activity Level - Current:   Independent     Needed?: No    Isolation: No  Infection: Not Applicable    Bariatric?: No    Vital Signs:   Vitals:    03/22/18 0300 03/22/18 0317 03/22/18 0330 03/22/18 0400   BP:  99/68 99/64 97/66   Pulse:       Resp:       Temp:       TempSrc:       SpO2: (!) 89% 98% 98% 98%   Weight:       Height:           Cardiac Rhythm: ,        Pain level:      Is this patient confused?: No     Patient Report: Initial Complaint: dizziness  Focused Assessment: patient with recent fall and consult for back pain took a friends methadone yesterday to help with his pain, now having dizziness and feeling \"not well\" Patient sa02 drops to mid 80's without 2L NC. Pupils noted to be pinpoint bilaterally. He is alert and oriented x 4. He denies drug or alcohol usage today.   Tests Performed:  Wbc, lactate, alcohol, bmp, head ct, cxr  Abnormal Results: wbc 16.1  Treatments provided: 1L NS bolus, oxygen via NC    Family Comments: here by self    OBS brochure/video discussed/provided to patient: Yes    ED Medications:   Medications   meclizine (ANTIVERT) tablet 25 mg (25 mg Oral Given 3/22/18 0239)   ondansetron (ZOFRAN) injection 4 mg (4 mg Intravenous Given 3/22/18 0239)   0.9% sodium chloride BOLUS (1,000 mLs Intravenous New Bag 3/22/18 0311)       Drips infusing?:  No    For the majority of the shift this patient was Green.   Interventions performed were frequent rounding.    Severe Sepsis OR Septic Shock Diagnosis Present: No      ED NURSE PHONE NUMBER: [id number]       "

## 2018-03-22 NOTE — PLAN OF CARE
Problem: Patient Care Overview  Goal: Plan of Care/Patient Progress Review  Outcome: Improving  PRIOR TO DISCHARGE     Comments: -diagnostic tests and consults completed and resulted: not met  -vital signs normal or at patient baseline: met on RA   -dizziness improves, able to get up and ambulate safely: met, able to ambulate without dizziness

## 2018-03-23 ENCOUNTER — TELEPHONE (OUTPATIENT)
Dept: FAMILY MEDICINE | Facility: CLINIC | Age: 38
End: 2018-03-23

## 2018-03-23 NOTE — TELEPHONE ENCOUNTER
"Patient last seen in 2016.     Hospital/TCU/ED for chronic condition Discharge Protocol    \"Hi, my name is Alma Delia Huitron, a registered nurse, and I am calling from [facility].  I am calling to follow up and see how things are going for you after your recent emergency visit/hospital/TCU stay.\"    Tell me how you are doing now that you are home?\" I am doing okay, my head still hurts, but I am doing okay.       Discharge Instructions    \"Let's review your discharge instructions.  What is/are the follow-up recommendations?  Pt. Response: none given, declined to go over the instructions with caller.     \"Has an appointment with your primary care provider been scheduled?\"   patient declined offer to schedule appointment today, stating he will call back when he wants to schedule.     \"When you see the provider, I would recommend that you bring your medications with you.\"    Medications    \"Tell me what changed about your medicines when you discharged?\"    Changes to chronic meds?    0-1    \"What questions do you have about your medications?\"    None     New diagnoses of heart failure, COPD, diabetes, or MI?    No              Medication reconciliation completed? No, due to patient declined  Was MTM referral placed (*Make sure to put transitions as reason for referral)?   No    Call Summary    \"What questions or concerns do you have about your recent visit and your follow-up care?\"     none    \"If you have questions or things don't continue to improve, we encourage you contact us through the main clinic number (give number).  Even if the clinic is not open, triage nurses are available 24/7 to help you.     We would like you to know that our clinic has extended hours (provide information).  We also have urgent care (provide details on closest location and hours/contact info)\"      \"Thank you for your time and take care!\"             "